# Patient Record
Sex: FEMALE | Race: OTHER | HISPANIC OR LATINO | URBAN - METROPOLITAN AREA
[De-identification: names, ages, dates, MRNs, and addresses within clinical notes are randomized per-mention and may not be internally consistent; named-entity substitution may affect disease eponyms.]

---

## 2018-12-08 ENCOUNTER — EMERGENCY (EMERGENCY)
Facility: HOSPITAL | Age: 33
LOS: 1 days | Discharge: HOME | End: 2018-12-08
Attending: EMERGENCY MEDICINE | Admitting: EMERGENCY MEDICINE
Payer: COMMERCIAL

## 2018-12-08 VITALS
DIASTOLIC BLOOD PRESSURE: 88 MMHG | SYSTOLIC BLOOD PRESSURE: 135 MMHG | OXYGEN SATURATION: 100 % | HEART RATE: 95 BPM | RESPIRATION RATE: 16 BRPM | TEMPERATURE: 98 F

## 2018-12-08 DIAGNOSIS — Z98.891 HISTORY OF UTERINE SCAR FROM PREVIOUS SURGERY: Chronic | ICD-10-CM

## 2018-12-08 DIAGNOSIS — Z90.49 ACQUIRED ABSENCE OF OTHER SPECIFIED PARTS OF DIGESTIVE TRACT: Chronic | ICD-10-CM

## 2018-12-08 DIAGNOSIS — Z98.890 OTHER SPECIFIED POSTPROCEDURAL STATES: Chronic | ICD-10-CM

## 2018-12-08 PROCEDURE — 99283 EMERGENCY DEPT VISIT LOW MDM: CPT

## 2018-12-08 NOTE — ED ADULT TRIAGE NOTE - CHIEF COMPLAINT QUOTE
Pt states that she has noticed blood with BM, states that stool is soft and brown since Thursday.  Pt states that she was seen in ED at [facility] and was told that she has an anal fissure, pt wants second opinion.  PMH asthma, sickle cell trait

## 2018-12-08 NOTE — ED PROVIDER NOTE - OBJECTIVE STATEMENT
Attending/Dustin: 34 yo F LMP 11/17/18 p/w rectal bleeding. Pt reports two to three days of initial abd cramps followed by diarrhea. Stool os watery-brown however noted blood on bathroom tissue and bowel. The abd pain had resolved after the first day and diarrhea is improving. Denies fever/chills, weakness or lightheadedness, n/v. Pt had been and eval at [facility] and here for a secondary opinion. Pt denies rectal trauma.

## 2018-12-08 NOTE — ED PROVIDER NOTE - PHYSICAL EXAMINATION
Attending/Dustin: Well-appearing, NAD; PERRL/EOMI, non-icterus, supple, no NATHAN, no JVD, RRR, CTAB; Abd-soft, NT/ND, no HSM; no LE edema, A&Ox3, nonfocal; Skin-warm/dry  Rectal Ex (Chaperone-PCA Bess): No perirectal STS, +ext hemorrhoids, no blood in rectal vault, light brown stool, +posterior fissure